# Patient Record
Sex: MALE | URBAN - METROPOLITAN AREA
[De-identification: names, ages, dates, MRNs, and addresses within clinical notes are randomized per-mention and may not be internally consistent; named-entity substitution may affect disease eponyms.]

---

## 2020-11-30 ENCOUNTER — OFFICE VISIT (OUTPATIENT)
Dept: URBAN - METROPOLITAN AREA CLINIC 29 | Facility: CLINIC | Age: 60
End: 2020-11-30
Payer: COMMERCIAL

## 2020-11-30 DIAGNOSIS — H25.042 POSTERIOR SUBCAPSULAR POLAR AGE-RELATED CATARACT, LEFT EYE: Primary | ICD-10-CM

## 2020-11-30 DIAGNOSIS — H26.491 OTHER SECONDARY CATARACT, RIGHT EYE: ICD-10-CM

## 2020-11-30 PROCEDURE — 92004 COMPRE OPH EXAM NEW PT 1/>: CPT | Performed by: OPTOMETRIST

## 2020-11-30 ASSESSMENT — INTRAOCULAR PRESSURE
OD: 16
OS: 17

## 2020-11-30 NOTE — IMPRESSION/PLAN
Impression: Posterior subcapsular polar age-related cataract, left eye: H25.042. Condition: worsening. Plan: Discussed diagnosis in detail with patient. Discussed treatment options with patient. Consult recommended [Cataract Specialist]. Discussed IOL lens options.

## 2020-11-30 NOTE — IMPRESSION/PLAN
Impression: Other secondary cataract, right eye: H26.491. Condition: will continue to monitor. Plan: Discussed diagnosis in detail with patient. Discussed treatment options with patient. Consult recommended [Cataract Specialist].

## 2020-12-09 ENCOUNTER — OFFICE VISIT (OUTPATIENT)
Dept: URBAN - METROPOLITAN AREA CLINIC 29 | Facility: CLINIC | Age: 60
End: 2020-12-09
Payer: COMMERCIAL

## 2020-12-09 PROCEDURE — 99204 OFFICE O/P NEW MOD 45 MIN: CPT | Performed by: OPHTHALMOLOGY

## 2020-12-09 RX ORDER — LEVOTHYROXINE SODIUM 50 UG/1
CAPSULE ORAL
Qty: 0 | Refills: 0 | Status: INACTIVE
Start: 2020-12-09 | End: 2020-12-30

## 2020-12-09 RX ORDER — POTASSIUM CHLORIDE 20 MEQ/15ML
SOLUTION ORAL
Qty: 0 | Refills: 0 | Status: INACTIVE
Start: 2020-12-09 | End: 2020-12-30

## 2020-12-09 ASSESSMENT — KERATOMETRY
OS: 46.00
OD: 46.00

## 2020-12-09 ASSESSMENT — VISUAL ACUITY: OD: 20/30

## 2020-12-09 NOTE — IMPRESSION/PLAN
Impression: Posterior subcapsular polar age-related cataract, left eye: H25.042. Marily Lee Visually significant, quality of life issue, could improve with surgery. Plan: Cataracts account for the patient's complaints. Discussed all risks, benefits, alternatives, procedures and recovery. Patient understands changing glasses will not improve vision. Patient desires to have surgery, recommend phacoemulsification with intraocular lens implant OS. lvl 2 - pt not for premium IOL -  AIM plano.

## 2020-12-30 ENCOUNTER — TESTING ONLY (OUTPATIENT)
Dept: URBAN - METROPOLITAN AREA CLINIC 29 | Facility: CLINIC | Age: 60
End: 2020-12-30
Payer: COMMERCIAL

## 2020-12-30 RX ORDER — PREDNISOLONE ACETATE 10 MG/ML
1 % SUSPENSION/ DROPS OPHTHALMIC
Qty: 10 | Refills: 1 | Status: INACTIVE
Start: 2020-12-30 | End: 2021-02-11

## 2020-12-30 RX ORDER — OFLOXACIN 3 MG/ML
0.3 % SOLUTION/ DROPS OPHTHALMIC
Qty: 5 | Refills: 1 | Status: INACTIVE
Start: 2020-12-30 | End: 2021-01-21

## 2020-12-30 RX ORDER — KETOROLAC TROMETHAMINE 5 MG/ML
0.5 % SOLUTION OPHTHALMIC
Qty: 10 | Refills: 1 | Status: INACTIVE
Start: 2020-12-30 | End: 2021-02-11

## 2020-12-30 ASSESSMENT — PACHYMETRY
OD: 4.57
OD: 25.91
OS: 25.92
OS: 3.58

## 2021-01-08 ENCOUNTER — PRE-OPERATIVE VISIT (OUTPATIENT)
Dept: URBAN - METROPOLITAN AREA CLINIC 23 | Facility: CLINIC | Age: 61
End: 2021-01-08
Payer: COMMERCIAL

## 2021-01-08 PROCEDURE — 76519 ECHO EXAM OF EYE: CPT | Performed by: OPHTHALMOLOGY

## 2021-01-08 ASSESSMENT — PACHYMETRY
OS: 3.88
OS: 25.90

## 2021-01-14 ENCOUNTER — SURGERY (OUTPATIENT)
Dept: URBAN - METROPOLITAN AREA SURGERY 11 | Facility: SURGERY | Age: 61
End: 2021-01-14
Payer: COMMERCIAL

## 2021-01-14 PROCEDURE — 66984 XCAPSL CTRC RMVL W/O ECP: CPT | Performed by: OPHTHALMOLOGY

## 2021-01-15 ENCOUNTER — POST-OPERATIVE VISIT (OUTPATIENT)
Dept: URBAN - METROPOLITAN AREA CLINIC 22 | Facility: CLINIC | Age: 61
End: 2021-01-15
Payer: COMMERCIAL

## 2021-01-15 PROCEDURE — 99024 POSTOP FOLLOW-UP VISIT: CPT | Performed by: OPTOMETRIST

## 2021-01-15 ASSESSMENT — INTRAOCULAR PRESSURE
OS: 20
OD: 19

## 2021-01-15 NOTE — IMPRESSION/PLAN
Impression: S/P Cataract Extraction by phacoemulsification with IOL placement OS - 1 Day. Presence of intraocular lens  Z96.1.  Plan: pt should maintain follow up with Sr. Elaine on 1-20 --Continue all meds

## 2021-01-26 ENCOUNTER — POST-OPERATIVE VISIT (OUTPATIENT)
Dept: URBAN - METROPOLITAN AREA CLINIC 29 | Facility: CLINIC | Age: 61
End: 2021-01-26
Payer: COMMERCIAL

## 2021-01-26 PROCEDURE — 99024 POSTOP FOLLOW-UP VISIT: CPT | Performed by: OPTOMETRIST

## 2021-01-26 ASSESSMENT — INTRAOCULAR PRESSURE
OD: 18
OS: 18

## 2021-01-26 NOTE — IMPRESSION/PLAN
Impression: S/P CE/Standard IOL  [id number] +17.50 OS - 12 Days. Presence of intraocular lens  Z96.1.  Plan: --Taper Prednisolone acetate 1% TID x 1 wk, BID x 1wk, QD x 1wk, then d/c

## 2021-02-22 ENCOUNTER — SURGERY (OUTPATIENT)
Dept: URBAN - METROPOLITAN AREA SURGERY 11 | Facility: SURGERY | Age: 61
End: 2021-02-22
Payer: COMMERCIAL

## 2021-02-22 PROCEDURE — 66821 AFTER CATARACT LASER SURGERY: CPT | Performed by: OPHTHALMOLOGY

## 2021-03-03 ENCOUNTER — POST-OPERATIVE VISIT (OUTPATIENT)
Dept: URBAN - METROPOLITAN AREA CLINIC 29 | Facility: CLINIC | Age: 61
End: 2021-03-03
Payer: COMMERCIAL

## 2021-03-03 PROCEDURE — 99024 POSTOP FOLLOW-UP VISIT: CPT | Performed by: OPTOMETRIST

## 2021-03-03 ASSESSMENT — INTRAOCULAR PRESSURE
OD: 16
OS: 16

## 2021-03-03 NOTE — IMPRESSION/PLAN
Impression: S/P YAG PC OD - 9 Days. Presence of intraocular lens  Z96.1.  Post operative instructions reviewed - Plan: --Discontinue all meds OS

## 2021-06-02 ENCOUNTER — OFFICE VISIT (OUTPATIENT)
Dept: URBAN - METROPOLITAN AREA CLINIC 29 | Facility: CLINIC | Age: 61
End: 2021-06-02
Payer: COMMERCIAL

## 2021-06-02 DIAGNOSIS — H26.492 OTHER SECONDARY CATARACT, LEFT EYE: Primary | ICD-10-CM

## 2021-06-02 PROCEDURE — 92012 INTRM OPH EXAM EST PATIENT: CPT | Performed by: OPHTHALMOLOGY

## 2021-06-02 ASSESSMENT — INTRAOCULAR PRESSURE
OD: 17
OS: 19

## 2021-06-28 ENCOUNTER — SURGERY (OUTPATIENT)
Dept: URBAN - METROPOLITAN AREA SURGERY 11 | Facility: SURGERY | Age: 61
End: 2021-06-28
Payer: COMMERCIAL

## 2021-06-28 PROCEDURE — 66821 AFTER CATARACT LASER SURGERY: CPT | Performed by: OPHTHALMOLOGY

## 2021-07-12 ENCOUNTER — POST-OPERATIVE VISIT (OUTPATIENT)
Dept: URBAN - METROPOLITAN AREA CLINIC 29 | Facility: CLINIC | Age: 61
End: 2021-07-12
Payer: COMMERCIAL

## 2021-07-12 DIAGNOSIS — Z96.1 PRESENCE OF INTRAOCULAR LENS: Primary | ICD-10-CM

## 2021-07-12 PROCEDURE — 99024 POSTOP FOLLOW-UP VISIT: CPT | Performed by: OPTOMETRIST

## 2021-07-12 ASSESSMENT — INTRAOCULAR PRESSURE
OD: 16
OS: 16

## 2021-07-12 NOTE — IMPRESSION/PLAN
Impression: S/P YAG Capsulotomy (Yttrium Aluminum [place]) OS - 14 Days. Presence of intraocular lens  Z96.1. Plan: --Advised patient to use artificial tears for comfort.

## 2025-06-12 ENCOUNTER — OFFICE VISIT (OUTPATIENT)
Dept: URBAN - METROPOLITAN AREA CLINIC 16 | Facility: CLINIC | Age: 65
End: 2025-06-12
Payer: COMMERCIAL

## 2025-06-12 DIAGNOSIS — H52.4 PRESBYOPIA: ICD-10-CM

## 2025-06-12 DIAGNOSIS — H43.813 VITREOUS DEGENERATION, BILATERAL: Primary | ICD-10-CM

## 2025-06-12 DIAGNOSIS — Z96.1 PRESENCE OF INTRAOCULAR LENS: ICD-10-CM

## 2025-06-12 PROCEDURE — 92004 COMPRE OPH EXAM NEW PT 1/>: CPT | Performed by: OPTOMETRIST

## 2025-06-12 ASSESSMENT — INTRAOCULAR PRESSURE
OD: 15
OS: 15

## 2025-06-12 ASSESSMENT — VISUAL ACUITY
OD: 20/20
OS: 20/25